# Patient Record
Sex: MALE | NOT HISPANIC OR LATINO
[De-identification: names, ages, dates, MRNs, and addresses within clinical notes are randomized per-mention and may not be internally consistent; named-entity substitution may affect disease eponyms.]

---

## 2020-10-12 ENCOUNTER — APPOINTMENT (OUTPATIENT)
Dept: ORTHOPEDIC SURGERY | Facility: CLINIC | Age: 45
End: 2020-10-12
Payer: MEDICAID

## 2020-10-12 VITALS — BODY MASS INDEX: 31.39 KG/M2 | HEIGHT: 67 IN | WEIGHT: 200 LBS

## 2020-10-12 DIAGNOSIS — G89.29 PAIN IN RIGHT KNEE: ICD-10-CM

## 2020-10-12 DIAGNOSIS — M25.561 PAIN IN RIGHT KNEE: ICD-10-CM

## 2020-10-12 DIAGNOSIS — M25.562 PAIN IN RIGHT KNEE: ICD-10-CM

## 2020-10-12 PROCEDURE — 20610 DRAIN/INJ JOINT/BURSA W/O US: CPT | Mod: RT

## 2020-10-12 PROCEDURE — 99204 OFFICE O/P NEW MOD 45 MIN: CPT | Mod: 25

## 2020-10-12 PROCEDURE — 73564 X-RAY EXAM KNEE 4 OR MORE: CPT | Mod: 50

## 2020-10-12 NOTE — DISCUSSION/SUMMARY
[de-identified] : This patient has mild right knee osteoarthritis.  The patient is not an appropriate candidate for surgical intervention at this time. An extensive discussion was conducted on the natural history of the disease and the variety of surgical and non-surgical options available to the patient including, but not limited to non-steroidal anti-inflammatory medications, steroid injections, physical therapy, maintenance of ideal body weight, and reduction of activity.  I recommended and prescribed a course of Mobic and physical therapy.  The patient is also encouraged to trial a neoprene sleeve knee brace which can be purchased OTC.  The patient is also encouraged to consider use of a cane.  Today we performed a right knee intra-articular cortisone injection.  The patient will schedule an appointment as needed.\par \par Informed consent for the right knee injection was obtained. All questions were answered. A time out was performed. The right knee was prepped and draped in sterile fashion. Using sterile technique, the right knee was injected with 2cc of Kenalog, 4cc of 1% lidocaine, 4cc of 0.25% marcaine using a 21-gauge needle. A sterile dressing was applied. Post injection instructions were reviewed. The patient tolerated the procedure well.\par

## 2020-10-12 NOTE — REVIEW OF SYSTEMS
[Joint Pain] : joint pain [Negative] : Heme/Lymph [Joint Stiffness] : joint stiffness [Joint Swelling] : no joint swelling

## 2020-10-12 NOTE — HISTORY OF PRESENT ILLNESS
[de-identified] : This is very nice 45-year-old gentleman experiencing right knee pain, which is severe in intensity.  No significant pain in the left knee.  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  The pain substantially limits activities of daily living. Walking tolerance is reduced.  He feels swelling in the knee.  Squatting makes the pain worse.  Twisting about the knee makes the pain worse.  Over-the-counter Motrin does help.  He does not use a knee sleeve.  He is not using a cane or walker.  The patient denies any radiation of the pain to the feet and it is not associated with numbness, tingling, or weakness.  The knee is not giving way.  He has not tried physical therapy for this.

## 2020-10-12 NOTE — PHYSICAL EXAM
[de-identified] : Patient is well nourished, well-developed, in no acute distress, with appropriate mood and affect. The patient is oriented to time, place, and person. Respirations are even and unlabored. Gait evaluation does not reveal a limp. There is no inguinal adenopathy. Examination of the contralateral knee shows normal range of motion, strength, no tenderness, and intact skin. The affected limb is well-perfused and showed 2+ dp/pt pulses, without skin lesions, shows a grossly normal motor and sensory examination. Knee motion is painless and the knee moves from 0 to 135 degrees. The knee is stable within that range-of-motion to AP and ML stress with a 1A Lachman, negative anterior or posterior drawer and no instability to varus or valgus stress. The alignment of the knee is 5 degrees varus. No effusion or crepitus is noted. No tenderness to palpation about the medial or lateral joint line, medial or lateral tibial plateau, medial or lateral femoral condyle, medial or lateral patellar facets, superior or inferior pole of the patella. Arsh's is negative. Muscle strength is normal. Pedal pulses are palpable. Hip examination was negative. [de-identified] : Long standing knee, AP knee, lateral knee, and patellar views of the right knee were ordered and taken in the office and demonstrate mild degenerative joint disease of the knee with joint space narrowing, osteophyte formation, and subchondral sclerosis.  \par \par Long standing knee, AP knee, lateral knee, and patellar views of the left knee were ordered and taken in the office and demonstrate no evidence of degenerative joint disease of the knee, fractures, intra-articular pathology.

## 2021-03-12 DIAGNOSIS — Z00.00 ENCOUNTER FOR GENERAL ADULT MEDICAL EXAMINATION W/OUT ABNORMAL FINDINGS: ICD-10-CM

## 2021-03-15 ENCOUNTER — APPOINTMENT (OUTPATIENT)
Dept: ORTHOPEDIC SURGERY | Facility: CLINIC | Age: 46
End: 2021-03-15
Payer: MEDICAID

## 2021-03-15 VITALS — HEIGHT: 67 IN | BODY MASS INDEX: 30.29 KG/M2 | WEIGHT: 193 LBS

## 2021-03-15 PROCEDURE — 73502 X-RAY EXAM HIP UNI 2-3 VIEWS: CPT | Mod: LT

## 2021-03-15 PROCEDURE — 99214 OFFICE O/P EST MOD 30 MIN: CPT

## 2021-03-15 PROCEDURE — 99072 ADDL SUPL MATRL&STAF TM PHE: CPT

## 2021-03-15 RX ORDER — BUSPIRONE HYDROCHLORIDE 15 MG/1
15 TABLET ORAL
Qty: 60 | Refills: 0 | Status: ACTIVE | COMMUNITY
Start: 2021-01-14

## 2021-03-15 RX ORDER — HYDROXYZINE PAMOATE 25 MG/1
25 CAPSULE ORAL
Qty: 60 | Refills: 0 | Status: ACTIVE | COMMUNITY
Start: 2021-03-11

## 2021-03-15 NOTE — PHYSICAL EXAM
[de-identified] : Patient is well nourished, well-developed, in no acute distress, with appropriate mood and affect. The patient is oriented to time, place, and person. Respirations are even and unlabored. Gait evaluation reveals a limp. There is no inguinal adenopathy. Examination of the contralateral hip shows normal range of motion, strength, no tenderness, and intact skin. The affected limb is well-perfused, shows a grossly normal motor and sensory examination. Examination of the hip shows no skin lesions. Hip motion is reduced and causes pain. FADIR is positive and LULU is positive. Stinchfield test is positive. Leg lengths are approximately equal. Both hips are stable and muscle strength is normal. Pedal pulses are palpable. 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

## 2021-03-15 NOTE — HISTORY OF PRESENT ILLNESS
[de-identified] : This is very nice 45-year-old gentleman experiencing left hip and groin and thigh pain, which is severe in intensity.  He is here for a second opinion today from [facility].  The pain moderately limits activities of daily living. Walking tolerance is somewhat reduced.  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  He has a history of taking oral steroids as a child for severe asthma.  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  He also smokes cigarettes.  He does not react well to Mobic.  He has not tried hip injections.  He does not use a cane or walker.  The patient denies any radiation of the pain to the feet and it is not associated with numbness, tingling, or weakness.

## 2021-03-15 NOTE — DISCUSSION/SUMMARY
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  This is likely secondary to steroid use as a child.  I had a discussion with the patient, who is a candidate for total hip replacement. Risks, benefits and alternatives were discussed. At this point, the patient wants to hold off as the pain is not quite severe enough.  Celebrex was prescribed.  He continue with a home exercise program.  Follow-up in 6 months.

## 2021-11-05 ENCOUNTER — APPOINTMENT (OUTPATIENT)
Dept: ORTHOPEDIC SURGERY | Facility: CLINIC | Age: 46
End: 2021-11-05

## 2021-11-08 ENCOUNTER — APPOINTMENT (OUTPATIENT)
Dept: ORTHOPEDIC SURGERY | Facility: CLINIC | Age: 46
End: 2021-11-08

## 2022-02-07 ENCOUNTER — APPOINTMENT (OUTPATIENT)
Dept: ORTHOPEDIC SURGERY | Facility: CLINIC | Age: 47
End: 2022-02-07
Payer: MEDICAID

## 2022-02-07 VITALS — BODY MASS INDEX: 31.39 KG/M2 | HEIGHT: 67 IN | WEIGHT: 200 LBS

## 2022-02-07 PROCEDURE — 99215 OFFICE O/P EST HI 40 MIN: CPT

## 2022-02-07 PROCEDURE — 73502 X-RAY EXAM HIP UNI 2-3 VIEWS: CPT

## 2022-02-07 RX ORDER — LORATADINE 10 MG/1
10 TABLET ORAL
Qty: 10 | Refills: 0 | Status: ACTIVE | COMMUNITY
Start: 2022-01-07

## 2022-02-07 NOTE — HISTORY OF PRESENT ILLNESS
[de-identified] : This is very nice 46-year-old gentleman experiencing left hip and groin and thigh pain, which is severe in intensity. He has known AVN L hip Ficat stage 3. The pain severely limits activities of daily living. Walking tolerance is severely reduced.  He has a history of taking oral steroids as a child for severe asthma.  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  He also smokes cigarettes and is down to 3 cigs.  He does not react well to Mobic.  He has not tried hip injections.  He does not use a cane or walker.  The patient denies any radiation of the pain to the feet and it is not associated with numbness, tingling, or weakness.

## 2022-02-07 NOTE — PHYSICAL EXAM
[de-identified] : Patient is well nourished, well-developed, in no acute distress, with appropriate mood and affect. The patient is oriented to time, place, and person. Respirations are even and unlabored. Gait evaluation reveals a limp. There is no inguinal adenopathy. Examination of the contralateral hip shows normal range of motion, strength, no tenderness, and intact skin. The affected limb is well-perfused, shows a grossly normal motor and sensory examination. Examination of the hip shows no skin lesions. Hip motion is reduced and causes pain. FADIR is positive and LULU is positive. Stinchfield test is positive. Leg lengths are approximately short on the left by 1 cm. Both hips are stable and muscle strength is normal. Pedal pulses are palpable. 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

## 2022-02-07 NOTE — DISCUSSION/SUMMARY
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  This is likely secondary to steroid use as a child.  He has failed a course of conservative management of like to proceed with a direct anterior approach left total arthroplasty.\par \par The patient is an appropriate candidate for consideration of left total hip replacement. An extensive discussion was conducted of the natural history of the disease and the variety of surgical and non-surgical treatment options available to the patient. A risk/benefit analysis was discussed with the patient reviewing the advantages and disadvantages of surgical intervention at this time. Both the level and length of the patient's pain have made additional conservative treatment measures consisting of NSAIDs, physical therapy, and/or corticosteroids contraindicated. A full explanation was given of the nature and the purpose of the procedure and anesthesia, its benefits, possible alternative methods of diagnosis or treatment, the risks involved, the possibility of complications, the foreseeable consequences of the procedure and the possible results of the non-treatment. I reviewed the plan of care as well as used a model of a total hip implant equivalent to the one that will be used for their total hip joint replacement. The ability to secure the implant utilizing cement or cementless (press-fit) was discussed with the patient. The patient agrees with the plan of care, as well as the use of implants for their total hip replacement. \par \par No guarantee or assurance was made as to the results that may be obtained. Specifically, the risks were identified to include, but are not limited to the following: Infection, phlebitis, pulmonary embolism, death, paralysis, dislocation, pain, stiffness, instability, limp, weakness, breakage, leg-length inequality, uncontrolled bleeding, nerve injury, blood vessel injury, pressure sores, anesthetic risks, delayed healing of wound and bone, and wear and loosening. Further discussion was undertaken with the patient about the details of surgical preparation, treatment, and postoperative rehabilitation including medical clearance, autotransfusion, the hospital course, and the postoperative rehabilitation involved. All in all, I feel that this patient is a good candidate for surgical reconstruction.\par \par The patient and I discussed the current SARS-CoV-2 (COVID-19) pandemic which has affected our local hospitals. We discussed that our hospitals treat patients with COVID-19. All efforts will be made to avoid cohorting the patient with diagnosed or suspected COVID-19 patient. They also understand that we will screen them 24-48 hours prior to surgery. Despite our best efforts, there is a potential risk for iatrogenic transmission of COVID-19 to the patient during the perioperative period. Sita COVID-19 during the perioperative period may increase the patient´s risks of an adverse outcome including postoperative pneumonia, difficulty breathing, requirement for a breathing tube (general endotracheal intubation), and death. The patient is understanding of this risk, and is willing to proceed with surgery at this time.\par \par The patient and I discussed the option for 23 hour stay joint replacement. They will stay overnight in the hospital after surgery and will discharge home on the next day of surgery. The risks and benefits of this type of rapid recovery protocol was reviewed with the patient and they are in agreement with proceeding with 23 hour stay joint replacement surgery. They understand that in the event of an emergency, that they will be transferred to the main hospital for inpatient care. \par \par This patient is a smoker or has chronic use of nicotine products and understands our nicotine cessation policy and will be required to stop smoke 1 month before surgery and this will be continued through 1 month minimum after surgery. This includes all cigarettes, cigars, chewing tobacco, patches and gums which contain nicotine. I explained to the patient the risk of nicotine exposure, which is well documented in the orthopedic literature as increasing risks of wound breakdown (dehiscence), periprosthetic joint infection, dissatisfaction, chronic pain, and impaired overall outcome to the patient. The patient may be tested for nicotine in addition prior to undergoing joint arthroplasty. We discussed the options of quitting immediately, as well as the risk of tapering off with or without the use of medications or counseling. The patient is in agreement with this plan.  5 minutes was discussed on this particular portion of our interaction.\par  \par This patient understands that the implants that will be used may wear out or loosen over time resulting in failure of the device. Given the patient's relatively young age, this may require one or multiple revision operations in their lifetime. The patient understands this and is willing to proceed with surgery at this time.

## 2022-10-25 ENCOUNTER — APPOINTMENT (OUTPATIENT)
Dept: ORTHOPEDIC SURGERY | Facility: CLINIC | Age: 47
End: 2022-10-25

## 2022-11-15 ENCOUNTER — APPOINTMENT (OUTPATIENT)
Dept: ORTHOPEDIC SURGERY | Facility: CLINIC | Age: 47
End: 2022-11-15

## 2022-11-15 VITALS — HEIGHT: 67.5 IN | WEIGHT: 193 LBS | BODY MASS INDEX: 29.94 KG/M2

## 2022-11-15 DIAGNOSIS — G89.29 PAIN IN LEFT KNEE: ICD-10-CM

## 2022-11-15 DIAGNOSIS — M25.562 PAIN IN LEFT KNEE: ICD-10-CM

## 2022-11-15 PROCEDURE — 99215 OFFICE O/P EST HI 40 MIN: CPT

## 2022-11-15 PROCEDURE — 73564 X-RAY EXAM KNEE 4 OR MORE: CPT | Mod: RT

## 2022-11-15 PROCEDURE — 73502 X-RAY EXAM HIP UNI 2-3 VIEWS: CPT | Mod: LT

## 2022-11-15 NOTE — DISCUSSION/SUMMARY
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  This is likely secondary to steroid use as a child.  He has failed a course of conservative management of like to proceed with a direct anterior approach left total arthroplasty.\par \par The patient is an appropriate candidate for consideration of left total hip replacement. An extensive discussion was conducted of the natural history of the disease and the variety of surgical and non-surgical treatment options available to the patient. A risk/benefit analysis was discussed with the patient reviewing the advantages and disadvantages of surgical intervention at this time. Both the level and length of the patient's pain have made additional conservative treatment measures consisting of NSAIDs, physical therapy, and/or corticosteroids contraindicated. A full explanation was given of the nature and the purpose of the procedure and anesthesia, its benefits, possible alternative methods of diagnosis or treatment, the risks involved, the possibility of complications, the foreseeable consequences of the procedure and the possible results of the non-treatment. I reviewed the plan of care as well as used a model of a total hip implant equivalent to the one that will be used for their total hip joint replacement. The ability to secure the implant utilizing cement or cementless (press-fit) was discussed with the patient. The patient agrees with the plan of care, as well as the use of implants for their total hip replacement. \par \par No guarantee or assurance was made as to the results that may be obtained. Specifically, the risks were identified to include, but are not limited to the following: Infection, phlebitis, pulmonary embolism, death, paralysis, dislocation, pain, stiffness, instability, limp, weakness, breakage, leg-length inequality, uncontrolled bleeding, nerve injury, blood vessel injury, pressure sores, anesthetic risks, delayed healing of wound and bone, and wear and loosening. Further discussion was undertaken with the patient about the details of surgical preparation, treatment, and postoperative rehabilitation including medical clearance, autotransfusion, the hospital course, and the postoperative rehabilitation involved. All in all, I feel that this patient is a good candidate for surgical reconstruction.\par \par The patient and I discussed the current SARS-CoV-2 (COVID-19) pandemic which has affected our local hospitals. We discussed that our hospitals treat patients with COVID-19. All efforts will be made to avoid cohorting the patient with diagnosed or suspected COVID-19 patient. They also understand that we will screen them 24-48 hours prior to surgery. Despite our best efforts, there is a potential risk for iatrogenic transmission of COVID-19 to the patient during the perioperative period. Sita COVID-19 during the perioperative period may increase the patient´s risks of an adverse outcome including postoperative pneumonia, difficulty breathing, requirement for a breathing tube (general endotracheal intubation), and death. The patient is understanding of this risk, and is willing to proceed with surgery at this time.\par \par This patient is a smoker or has chronic use of nicotine products and understands our nicotine cessation policy and will be required to stop smoke 1 month before surgery and this will be continued through 1 month minimum after surgery. This includes all cigarettes, cigars, chewing tobacco, patches and gums which contain nicotine. I explained to the patient the risk of nicotine exposure, which is well documented in the orthopedic literature as increasing risks of wound breakdown (dehiscence), periprosthetic joint infection, dissatisfaction, chronic pain, and impaired overall outcome to the patient. The patient may be tested for nicotine in addition prior to undergoing joint arthroplasty. We discussed the options of quitting immediately, as well as the risk of tapering off with or without the use of medications or counseling. The patient is in agreement with this plan.  5 minutes was discussed on this particular portion of our interaction.\par  \par This patient understands that the implants that will be used may wear out or loosen over time resulting in failure of the device. Given the patient's relatively young age, this may require one or multiple revision operations in their lifetime. The patient understands this and is willing to proceed with surgery at this time.

## 2022-11-15 NOTE — HISTORY OF PRESENT ILLNESS
[de-identified] : This is very nice 47-year-old gentleman experiencing left hip and groin and thigh pain as well as right knee, which is severe in intensity. He has known AVN KIKO hip Ficat stage 3. The pain severely limits activities of daily living. Walking tolerance is severely reduced.  He has a history of taking oral steroids as a child for severe asthma.  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  He is quite anxious about surgery.  He also smokes cigarettes and is down to 3 cigs.  He does not react well to Mobic.  He has not tried hip injections.  He does not use a cane or walker.  The patient denies any radiation of the pain to the feet and it is not associated with numbness, tingling, or weakness.

## 2022-11-15 NOTE — PHYSICAL EXAM
[de-identified] : Patient is well nourished, well-developed, in no acute distress, with appropriate mood and affect. The patient is oriented to time, place, and person. Respirations are even and unlabored. Gait evaluation reveals a limp. There is no inguinal adenopathy.  The left limb is well-perfused, shows a grossly normal motor and sensory examination. Examination of the hip shows no skin lesions. Hip motion is reduced and causes pain. FADIR is positive and LULU is positive. Stinchfield test is positive. Leg lengths are approximately short on the left by 1 cm. Both hips are stable and muscle strength is normal. Pedal pulses are palpable. The right limb is well-perfused, without skin lesions, shows a grossly normal motor and sensory examination. Knee motion is significantly reduced and does cause significant pain. The knee moves from 0 to 125 degrees. The knee is stable within that range-of-motion to AP and ML stress. The alignment of the knee is 5 degrees varus. Muscle strength is normal. Pedal pulses are palpable. 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

## 2022-12-28 ENCOUNTER — APPOINTMENT (OUTPATIENT)
Dept: PHYSICAL MEDICINE AND REHAB | Facility: CLINIC | Age: 47
End: 2022-12-28

## 2022-12-28 VITALS
OXYGEN SATURATION: 99 % | SYSTOLIC BLOOD PRESSURE: 146 MMHG | HEART RATE: 82 BPM | TEMPERATURE: 98.06 F | DIASTOLIC BLOOD PRESSURE: 91 MMHG

## 2022-12-28 DIAGNOSIS — M87.9 OSTEONECROSIS, UNSPECIFIED: ICD-10-CM

## 2022-12-28 PROCEDURE — 99204 OFFICE O/P NEW MOD 45 MIN: CPT

## 2022-12-28 RX ORDER — AZITHROMYCIN 250 MG/1
250 TABLET, FILM COATED ORAL
Qty: 6 | Refills: 0 | Status: COMPLETED | COMMUNITY
Start: 2022-01-07 | End: 2022-12-28

## 2022-12-28 RX ORDER — MECLIZINE HYDROCHLORIDE 12.5 MG/1
12.5 TABLET ORAL
Qty: 15 | Refills: 0 | Status: DISCONTINUED | COMMUNITY
Start: 2020-11-18 | End: 2022-12-28

## 2022-12-28 RX ORDER — HYALURONATE SODIUM 10 MG/ML
20 VIAL (ML) INTRAARTICULAR
Qty: 3 | Refills: 0 | Status: ACTIVE | OUTPATIENT
Start: 2022-12-28

## 2022-12-28 RX ORDER — AMOXICILLIN AND CLAVULANATE POTASSIUM 875; 125 MG/1; MG/1
875-125 TABLET, COATED ORAL
Qty: 20 | Refills: 0 | Status: COMPLETED | COMMUNITY
Start: 2021-01-09 | End: 2022-12-28

## 2022-12-28 RX ORDER — CELECOXIB 100 MG/1
100 CAPSULE ORAL
Qty: 60 | Refills: 2 | Status: DISCONTINUED | COMMUNITY
Start: 2021-03-15 | End: 2022-12-28

## 2022-12-28 RX ORDER — AMOXICILLIN 500 MG/1
500 CAPSULE ORAL
Qty: 28 | Refills: 0 | Status: COMPLETED | COMMUNITY
Start: 2020-09-21 | End: 2022-12-28

## 2022-12-28 RX ORDER — MELOXICAM 15 MG/1
15 TABLET ORAL
Qty: 30 | Refills: 1 | Status: DISCONTINUED | COMMUNITY
Start: 2020-10-12 | End: 2022-12-28

## 2022-12-28 NOTE — PHYSICAL EXAM
[FreeTextEntry1] : General: Well-developed male in no apparent distress.  Patient is awake, alert, and oriented x3.  Cooperative.\par HEENT: Normocephalic, atraumatic.  MMM.\par Lungs: Clear to auscultation.\par Cardiac: Regular rate and rhythm.\par Abdomen: Bowel sounds present, nondistended.\par Extremities: No pedal edema.\par \par Right knee examination: Mild joint effusion and crepitus.  Tenderness to palpation noted over the medial joint space.  Negative Lachman sign, no ligamentous laxity.  Genu varum noted.  Full active range of motion at the knee.\par \par Left hip examination: 0 to 5 degrees internal rotation/40 degrees external rotation passively with pain present at the groin and lateral hip.  Hip flexion to 90 degrees, full extension.\par \par Motor:\par Both upper extremities: Tone normal, active range of motion within functional limits with 5/5 motor power throughout.  Thumb to digit opposition intact bilaterally\par Both lower extremities: Tone normal, 5/5 motor power throughout.\par \par Sensory: Intact to light touch and pinprick in both lower extremities.\par Muscle stretch reflexes: +2 KJ, +2 AJ and symmetric.\par \par Functional status: Patient ambulates with antalgic gait with significant Trendelenburg gait to the left.  Negative Trendelenburg test.  Patient bilateral genu varum (right greater than left).

## 2022-12-28 NOTE — HISTORY OF PRESENT ILLNESS
[FreeTextEntry1] : Patient is a 47-year-old left-hand-dominant male history of right knee osteoarthritis, left hip avascular necrosis who presents today with main complaint of worsening left groin/lateral hip pain over the past 2 years.  Patient describes the pain as a deep aching pain which exacerbates with walking and sit to stand transfers.  No pain at rest.  Pain is limiting his walking to approximately 1 block with a pain score of 7/10 during ambulation.  No focal motor weakness, numbness or bowel bladder incontinence.  Currently no low back pain.  Patient has been evaluated by orthopedics and is a candidate for DANA.  Patient has been treated in the past with meloxicam and celecoxib however was unable to tolerate.  Patient is been taking ibuprofen/Naprosyn as needed.  Functionally the patient ambulates with a straight axis cane for longer distance ambulation.  Independent ambulation within the home without assistive device.  Independent transfers and activities of daily living.  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  Pain is on the medial aspect of his knee described as a sharp pain with a pain score up to 8/10.  Patient has had cortisone injections in the past, last one in 2021.

## 2022-12-28 NOTE — REVIEW OF SYSTEMS
[Joint Pain] : joint pain [Joint Stiffness] : joint stiffness [Difficulty Walking] : difficulty walking [Negative] : Gastrointestinal [Fever] : no fever [Incontinence] : no incontinence [Muscle Weakness] : no muscle weakness

## 2022-12-28 NOTE — ASSESSMENT
[FreeTextEntry1] : Patient is a 47-year-old LHD male history of asthma, right knee osteoarthritis, left hip avascular necrosis with chronic left hip and right knee pain and gait impairments noted above.  Patient has been unable to tolerate NSAIDs.  Recommend standing regimen of Tylenol 500 mg p.o. 3 times daily, side effects and drug interactions reviewed.  We will seek authorization for a series of 3 Hyalgan injections to the right knee.  Discussed benefits of unloading the joints with either a walker or straight axis cane in the right hand.  Patient will consider use of a straight axis cane in the right hand and proper ambulation technique reviewed with the patient.  Reviewed criteria for DANA with patient and patient to remain out of work.\par \par I spent a total of 45 minutes on the date of the encounter evaluating and treating the patient including a discussion of treatment options.

## 2023-04-10 ENCOUNTER — NON-APPOINTMENT (OUTPATIENT)
Age: 48
End: 2023-04-10

## 2023-04-26 ENCOUNTER — APPOINTMENT (OUTPATIENT)
Dept: PHYSICAL MEDICINE AND REHAB | Facility: CLINIC | Age: 48
End: 2023-04-26
Payer: MEDICAID

## 2023-04-26 PROCEDURE — 20610 DRAIN/INJ JOINT/BURSA W/O US: CPT | Mod: RT

## 2023-04-26 NOTE — PROCEDURE
[Consent] : consent was given by patient or guardian [Site Verification] : the injection site was verified [Post-Injection Instructions Provided] : Post-injection instructions were provided [] : the right [____] : [unfilled] syringes were used [Total Units: ______] : [unfilled] units [de-identified] : Procedural note for MSK injection:\par \par The procedure was explained in detail including associated risks and informed consent was obtained.  Under sterile conditions a right knee intra-articular injection was done with a 22-gauge 1-1/2 inch needle via superior medial approach.  8 cc straw-colored fluid aspirated and 1 syringe of 2 mL Synvisc injected.  Tolerated well.\par \par Right knee injection: #1 of 3. [TWNoteComboBox2] : Synvisc [TWNoteComboBox1] : medial knee

## 2023-04-26 NOTE — ASSESSMENT
[FreeTextEntry1] : Patient is a 47-year-old LHD male history of asthma, right knee osteoarthritis, left hip avascular necrosis with exacerbation of right knee osteoarthritis.  Patient underwent a right intra-articular injection with Synvisc, tolerated well. \par \par Right knee injection #1 of 3.\par \par

## 2023-05-03 ENCOUNTER — APPOINTMENT (OUTPATIENT)
Dept: PHYSICAL MEDICINE AND REHAB | Facility: CLINIC | Age: 48
End: 2023-05-03
Payer: MEDICAID

## 2023-05-03 PROCEDURE — 20610 DRAIN/INJ JOINT/BURSA W/O US: CPT | Mod: RT

## 2023-05-03 NOTE — PROCEDURE
[Consent] : consent was given by patient or guardian [Site Verification] : the injection site was verified [Post-Injection Instructions Provided] : Post-injection instructions were provided [] : the right [____] : [unfilled] syringes were used [Total Units: ______] : [unfilled] units [de-identified] : Procedural note for MSK injection:\par \par The procedure was explained in detail including associated risks and informed consent was obtained.  Under sterile conditions a right knee intra-articular injection was done with a 22-gauge 1-1/2 inch needle via superior/medial approach.  22 cc straw-colored fluid aspirated and 1 syringe of 2 mL Synvisc injected.  Tolerated well.\par \par Right knee injection: #2 of 3. [TWNoteComboBox1] : medial knee [TWNoteComboBox2] : Synvisc

## 2023-05-03 NOTE — ASSESSMENT
[FreeTextEntry1] : Patient is a 48-year-old LHD male history of asthma, right knee osteoarthritis, left hip avascular necrosis with exacerbation of right knee osteoarthritis.  Patient underwent a right intra-articular injection with Synvisc, tolerated well. \par \par Right knee injection #2 of 3.\par \par

## 2023-05-10 ENCOUNTER — APPOINTMENT (OUTPATIENT)
Dept: PHYSICAL MEDICINE AND REHAB | Facility: CLINIC | Age: 48
End: 2023-05-10
Payer: MEDICAID

## 2023-05-10 VITALS
TEMPERATURE: 97.2 F | DIASTOLIC BLOOD PRESSURE: 72 MMHG | HEART RATE: 83 BPM | OXYGEN SATURATION: 98 % | SYSTOLIC BLOOD PRESSURE: 138 MMHG

## 2023-05-10 DIAGNOSIS — M17.11 UNILATERAL PRIMARY OSTEOARTHRITIS, RIGHT KNEE: ICD-10-CM

## 2023-05-10 PROCEDURE — 20610 DRAIN/INJ JOINT/BURSA W/O US: CPT | Mod: RT

## 2023-05-10 NOTE — ASSESSMENT
[FreeTextEntry1] : Patient is a 48-year-old LHD male history of asthma, right knee osteoarthritis, left hip avascular necrosis with exacerbation of right knee osteoarthritis.  Patient underwent a right intra-articular injection with Synvisc, tolerated well. \par \par Right knee injection #3 of 3.\par \par

## 2023-05-10 NOTE — PROCEDURE
[Consent] : consent was given by patient or guardian [Site Verification] : the injection site was verified [Post-Injection Instructions Provided] : Post-injection instructions were provided [] : the right [____] : [unfilled] syringes were used [Total Units: ______] : [unfilled] units [de-identified] : Procedural note for MSK injection:\par \par The procedure was explained in detail including associated risks and informed consent was obtained.  Under sterile conditions a right knee intra-articular injection was done with a 22-gauge 1-1/2 inch needle via superior/medial approach.  12 cc straw-colored fluid aspirated and 1 syringe of 2 mL Synvisc injected.  Tolerated well.\par \par Right knee injection: #3 of 3. [TWNoteComboBox2] : Synvisc [TWNoteComboBox1] : medial knee

## 2024-06-18 ENCOUNTER — APPOINTMENT (OUTPATIENT)
Dept: ORTHOPEDIC SURGERY | Facility: CLINIC | Age: 49
End: 2024-06-18

## 2024-07-19 ENCOUNTER — APPOINTMENT (OUTPATIENT)
Dept: ORTHOPEDIC SURGERY | Facility: CLINIC | Age: 49
End: 2024-07-19
Payer: MEDICAID

## 2024-07-19 VITALS — HEIGHT: 67.5 IN

## 2024-07-19 VITALS — BODY MASS INDEX: 29.01 KG/M2 | WEIGHT: 188 LBS

## 2024-07-19 DIAGNOSIS — M87.9 OSTEONECROSIS, UNSPECIFIED: ICD-10-CM

## 2024-07-19 PROCEDURE — 99215 OFFICE O/P EST HI 40 MIN: CPT

## 2024-07-19 PROCEDURE — 73502 X-RAY EXAM HIP UNI 2-3 VIEWS: CPT | Mod: LT

## 2024-07-19 PROCEDURE — G2211 COMPLEX E/M VISIT ADD ON: CPT | Mod: NC,1L

## 2024-09-23 ENCOUNTER — APPOINTMENT (OUTPATIENT)
Dept: ORTHOPEDIC SURGERY | Facility: HOSPITAL | Age: 49
End: 2024-09-23